# Patient Record
(demographics unavailable — no encounter records)

---

## 2024-12-04 NOTE — REVIEW OF SYSTEMS
[Diarrhea: Grade 0] : Diarrhea: Grade 0 [Skin Rash] : no skin rash [Skin Wound] : no skin wound [Negative] : Allergic/Immunologic [de-identified] : hardness over the top of the L breast reconstruction

## 2024-12-04 NOTE — CONSULT LETTER
[Dear  ___] : Dear  [unfilled], [Courtesy Letter:] : I had the pleasure of seeing your patient, [unfilled], in my office today. [Please see my note below.] : Please see my note below. [Consult Closing:] : Thank you very much for allowing me to participate in the care of this patient.  If you have any questions, please do not hesitate to contact me. [Sincerely,] : Sincerely, [FreeTextEntry2] : Nae Mac MD  [address] [facility] [place] [address] [place] [id number] [FreeTextEntry3] : Anthony Guzman MD Attending [facility] [DrClayton  ___] : Dr. ABDI

## 2024-12-04 NOTE — CONSULT LETTER
[Dear  ___] : Dear  [unfilled], [Courtesy Letter:] : I had the pleasure of seeing your patient, [unfilled], in my office today. [Please see my note below.] : Please see my note below. [Consult Closing:] : Thank you very much for allowing me to participate in the care of this patient.  If you have any questions, please do not hesitate to contact me. [Sincerely,] : Sincerely, [FreeTextEntry2] : Nae Mac MD  [address] [facility] [place] [address] [place] [id number] [FreeTextEntry3] : Anthony Guzman MD Attending [facility] [DrClayton  ___] : Dr. ABDI

## 2024-12-04 NOTE — PHYSICAL EXAM
[Fully active, able to carry on all pre-disease performance without restriction] : Status 0 - Fully active, able to carry on all pre-disease performance without restriction [Normal] : affect appropriate [de-identified] : B mastectomy with reconstruction: induration over the medial L reconstruction with nodule over the 11:00 circumscribed and no calor or erythema; no abnl axillary LN palpable  [de-identified] : no nodules; dry skin patch over the R nipple reconstruction and L lateral breast reconstruction

## 2024-12-04 NOTE — REVIEW OF SYSTEMS
[Diarrhea: Grade 0] : Diarrhea: Grade 0 [Skin Rash] : no skin rash [Skin Wound] : no skin wound [Negative] : Allergic/Immunologic [de-identified] : hardness over the top of the L breast reconstruction

## 2024-12-04 NOTE — HISTORY OF PRESENT ILLNESS
[Disease: _____________________] : Disease: [unfilled] [T: ___] : T[unfilled] [N: ___] : N[unfilled] [AJCC Stage: ____] : AJCC Stage: [unfilled] [de-identified] : Age 46: left breast cancer\par  Screen detected: 12/2021 which showed left breast at 4:00 4 cm FN hypoechoic nodule 0.4 x 0.5 cm. She underwent u/s guided biopsy of the left breast nodule 12/2021 which showed invasive lobular carcinoma that was ER 81-90%, TX 5%, Iox1hks negative. MRI of the breast showed additional 6:00 L breast enhancement and she had biopsy done on the 6:00 lesion showing invasive lobular carcinoma that was ER 20% and TX 98%, Sxn0wwe negative. She had Mammoprint done on biopsy specimen from MRI (10--1248 ER 20%, TX 98%. [id number]) had low risk score of 0.416. She underwent prophylactic R mastectomy and SLNB and L mastectomy and SLNB with Dr Ly on 3/24/2022. The pathology showed infiltrating moderately differentiated lobular carcinoma (0.6 cm) and LCIS with 1/2 LN positive for carcinoma (1.1). She completed RT with Dr Rodriguez. She started on tamoxifen 4/2022.  [de-identified] : infiltrating lobular carcinoma ER 20% VT > 98% and RDE0fif negative [de-identified] : tamoxifen 4/2022 to present  [de-identified] : She had Tram flap for the L mastectomy site with Dr Umana on 7/2/2024 and now the hardness over the top instead of bottom of the reconstruction. She had returned from vacation. She had run out of her blood pressure medication and will be seeing her PCP and getting BP medications. She was in a hurry coming here but will make sure she gets her BP medications and has BP repeated afterwards. She had period 11/27/2024 and last period was October. Denies any new breast pain or chest wall changes. No back pain, cough or HA.

## 2024-12-04 NOTE — ASSESSMENT
[FreeTextEntry1] : She is a 48 y/o perimenopausal AAF with Stage I T1N1 invasive lobular carcinoma with Mammoprint low risk score. She completed RT and has been tolerating tamoxifen. She is s/p fibroid removal. She still has periods every few months. Reviewed GYN follow up. She will be seeing PCP. We reviewed signs and symptoms of breast cancer recurrence. No new signs or symptoms of recurrence. Will obtain copy of the blood work from her PCP. Questions answered to her satisfaction. She is agreeable with plan. Next follow up in 6 months but earlier if any new symptoms.

## 2024-12-04 NOTE — HISTORY OF PRESENT ILLNESS
[Disease: _____________________] : Disease: [unfilled] [T: ___] : T[unfilled] [N: ___] : N[unfilled] [AJCC Stage: ____] : AJCC Stage: [unfilled] [de-identified] : Age 46: left breast cancer\par  Screen detected: 12/2021 which showed left breast at 4:00 4 cm FN hypoechoic nodule 0.4 x 0.5 cm. She underwent u/s guided biopsy of the left breast nodule 12/2021 which showed invasive lobular carcinoma that was ER 81-90%, IL 5%, Oll6zrs negative. MRI of the breast showed additional 6:00 L breast enhancement and she had biopsy done on the 6:00 lesion showing invasive lobular carcinoma that was ER 20% and IL 98%, Yle4kqn negative. She had Mammoprint done on biopsy specimen from MRI (10--1248 ER 20%, IL 98%. [id number]) had low risk score of 0.416. She underwent prophylactic R mastectomy and SLNB and L mastectomy and SLNB with Dr Ly on 3/24/2022. The pathology showed infiltrating moderately differentiated lobular carcinoma (0.6 cm) and LCIS with 1/2 LN positive for carcinoma (1.1). She completed RT with Dr Rodriguez. She started on tamoxifen 4/2022.  [de-identified] : infiltrating lobular carcinoma ER 20% MD > 98% and POM7uvq negative [de-identified] : tamoxifen 4/2022 to present  [de-identified] : She had Tram flap for the L mastectomy site with Dr Umana on 7/2/2024 and now the hardness over the top instead of bottom of the reconstruction. She had returned from vacation. She had run out of her blood pressure medication and will be seeing her PCP and getting BP medications. She was in a hurry coming here but will make sure she gets her BP medications and has BP repeated afterwards. She had period 11/27/2024 and last period was October. Denies any new breast pain or chest wall changes. No back pain, cough or HA.

## 2024-12-04 NOTE — PHYSICAL EXAM
[Fully active, able to carry on all pre-disease performance without restriction] : Status 0 - Fully active, able to carry on all pre-disease performance without restriction [Normal] : affect appropriate [de-identified] : B mastectomy with reconstruction: induration over the medial L reconstruction with nodule over the 11:00 circumscribed and no calor or erythema; no abnl axillary LN palpable  [de-identified] : no nodules; dry skin patch over the R nipple reconstruction and L lateral breast reconstruction

## 2025-06-09 NOTE — PHYSICAL EXAM
[Fully active, able to carry on all pre-disease performance without restriction] : Status 0 - Fully active, able to carry on all pre-disease performance without restriction [Normal] : affect appropriate [de-identified] : B mastectomy with reconstruction: induration over the medial L reconstruction with nodule over the 11:00 circumscribed and no calor or erythema; no abnl axillary LN palpable  [de-identified] : no nodules; dry skin patch over the R nipple reconstruction and L lateral breast reconstruction

## 2025-06-09 NOTE — HISTORY OF PRESENT ILLNESS
[T: ___] : T[unfilled] [Disease: _____________________] : Disease: [unfilled] [N: ___] : N[unfilled] [AJCC Stage: ____] : AJCC Stage: [unfilled] [de-identified] : infiltrating lobular carcinoma ER 20% NE > 98% and HOM2iht negative [de-identified] : Age 46: left breast cancer\par  Screen detected: 12/2021 which showed left breast at 4:00 4 cm FN hypoechoic nodule 0.4 x 0.5 cm. She underwent u/s guided biopsy of the left breast nodule 12/2021 which showed invasive lobular carcinoma that was ER 81-90%, TN 5%, Rxm1vjp negative. MRI of the breast showed additional 6:00 L breast enhancement and she had biopsy done on the 6:00 lesion showing invasive lobular carcinoma that was ER 20% and TN 98%, Vzx3wlf negative. She had Mammoprint done on biopsy specimen from MRI (10--1248 ER 20%, TN 98%. [id number]) had low risk score of 0.416. She underwent prophylactic R mastectomy and SLNB and L mastectomy and SLNB with Dr Ly on 3/24/2022. The pathology showed infiltrating moderately differentiated lobular carcinoma (0.6 cm) and LCIS with 1/2 LN positive for carcinoma (1.1). She completed RT with Dr Rodriguez. She started on tamoxifen 4/2022.  [de-identified] : tamoxifen 4/2022 to present  [de-identified] : She has been on tamoxifen and had her last period in March. Has occasional worsening hot flashes. She denies any new arthralgias. Has lost weight: 21 lbs on the Mounjaro and tolerating without any increased GI side effects. She denies any new health changes or new medications. Denies any new breast pain or chest wall changes. No back pain, cough or HA. Will be going to [place] in September.

## 2025-06-09 NOTE — REVIEW OF SYSTEMS
[Chills] : no chills [Fever] : no fever [Night Sweats] : no night sweats [Fatigue] : no fatigue [Recent Change In Weight] : ~T recent weight change [Diarrhea: Grade 0] : Diarrhea: Grade 0 [Negative] : Allergic/Immunologic [FreeTextEntry2] : lost 21 lbs

## 2025-06-09 NOTE — PHYSICAL EXAM
[Fully active, able to carry on all pre-disease performance without restriction] : Status 0 - Fully active, able to carry on all pre-disease performance without restriction [Normal] : affect appropriate [de-identified] : B mastectomy with reconstruction: induration over the medial L reconstruction with nodule over the 11:00 circumscribed and no calor or erythema; no abnl axillary LN palpable  [de-identified] : no nodules; dry skin patch over the R nipple reconstruction and L lateral breast reconstruction

## 2025-06-09 NOTE — ASSESSMENT
[FreeTextEntry1] : She is a 50 y/o perimenopausal AAF with Stage I T1N1 invasive lobular carcinoma with Mammoprint low risk score. She completed RT and has been tolerating tamoxifen. She is still perimenopausal: will continue with tamoxifen. We reviewed signs and symptoms of breast cancer recurrence. No new signs or symptoms of recurrence. Will obtain copy of blood work done with her PCP: Dr Mac. Next follow up in 6 months but earlier if any new symptoms.

## 2025-06-09 NOTE — ASSESSMENT
[FreeTextEntry1] : She is a 48 y/o perimenopausal AAF with Stage I T1N1 invasive lobular carcinoma with Mammoprint low risk score. She completed RT and has been tolerating tamoxifen. She is still perimenopausal: will continue with tamoxifen. We reviewed signs and symptoms of breast cancer recurrence. No new signs or symptoms of recurrence. Will obtain copy of blood work done with her PCP: Dr Mac. Next follow up in 6 months but earlier if any new symptoms.

## 2025-06-09 NOTE — HISTORY OF PRESENT ILLNESS
[Disease: _____________________] : Disease: [unfilled] [T: ___] : T[unfilled] [N: ___] : N[unfilled] [AJCC Stage: ____] : AJCC Stage: [unfilled] [de-identified] : infiltrating lobular carcinoma ER 20% OR > 98% and VLI4gyf negative [de-identified] : Age 46: left breast cancer\par  Screen detected: 12/2021 which showed left breast at 4:00 4 cm FN hypoechoic nodule 0.4 x 0.5 cm. She underwent u/s guided biopsy of the left breast nodule 12/2021 which showed invasive lobular carcinoma that was ER 81-90%, CA 5%, Vmy4eji negative. MRI of the breast showed additional 6:00 L breast enhancement and she had biopsy done on the 6:00 lesion showing invasive lobular carcinoma that was ER 20% and CA 98%, Acn2kzy negative. She had Mammoprint done on biopsy specimen from MRI (10--1248 ER 20%, CA 98%. [id number]) had low risk score of 0.416. She underwent prophylactic R mastectomy and SLNB and L mastectomy and SLNB with Dr Ly on 3/24/2022. The pathology showed infiltrating moderately differentiated lobular carcinoma (0.6 cm) and LCIS with 1/2 LN positive for carcinoma (1.1). She completed RT with Dr Rodriguez. She started on tamoxifen 4/2022.  [de-identified] : tamoxifen 4/2022 to present  [de-identified] : She has been on tamoxifen and had her last period in March. Has occasional worsening hot flashes. She denies any new arthralgias. Has lost weight: 21 lbs on the Mounjaro and tolerating without any increased GI side effects. She denies any new health changes or new medications. Denies any new breast pain or chest wall changes. No back pain, cough or HA. Will be going to [place] in September.

## 2025-06-09 NOTE — BEGINNING OF VISIT
[0] : 2) Feeling down, depressed, or hopeless: Not at all (0) [PHQ-2 Negative] : PHQ-2 Negative [PHQ-9 Negative] : PHQ-9 Negative [Never] : Never [Date Discussed (MM/DD/YY): ___] : Discussed: [unfilled] [Reviewed, no changes] : Reviewed, no changes